# Patient Record
Sex: MALE
[De-identification: names, ages, dates, MRNs, and addresses within clinical notes are randomized per-mention and may not be internally consistent; named-entity substitution may affect disease eponyms.]

---

## 2019-12-30 ENCOUNTER — RECORD ABSTRACTING (OUTPATIENT)
Age: 74
End: 2019-12-30

## 2019-12-30 DIAGNOSIS — Z81.1 FAMILY HISTORY OF ALCOHOL ABUSE AND DEPENDENCE: ICD-10-CM

## 2019-12-30 DIAGNOSIS — E78.5 HYPERLIPIDEMIA, UNSPECIFIED: ICD-10-CM

## 2019-12-30 DIAGNOSIS — Z78.9 OTHER SPECIFIED HEALTH STATUS: ICD-10-CM

## 2019-12-30 DIAGNOSIS — I10 ESSENTIAL (PRIMARY) HYPERTENSION: ICD-10-CM

## 2019-12-30 DIAGNOSIS — Z87.891 PERSONAL HISTORY OF NICOTINE DEPENDENCE: ICD-10-CM

## 2019-12-30 DIAGNOSIS — Z87.898 PERSONAL HISTORY OF OTHER SPECIFIED CONDITIONS: ICD-10-CM

## 2019-12-30 DIAGNOSIS — Z82.3 FAMILY HISTORY OF STROKE: ICD-10-CM

## 2019-12-30 DIAGNOSIS — Z87.448 PERSONAL HISTORY OF OTHER DISEASES OF URINARY SYSTEM: ICD-10-CM

## 2019-12-30 DIAGNOSIS — Z82.5 FAMILY HISTORY OF ASTHMA AND OTHER CHRONIC LOWER RESPIRATORY DISEASES: ICD-10-CM

## 2019-12-30 DIAGNOSIS — Z82.49 FAMILY HISTORY OF ISCHEMIC HEART DISEASE AND OTHER DISEASES OF THE CIRCULATORY SYSTEM: ICD-10-CM

## 2019-12-30 LAB — TESTOST BND SERPL-MCNC: 267.2

## 2019-12-30 RX ORDER — AMLODIPINE BESYLATE 5 MG/1
TABLET ORAL
Refills: 0 | Status: ACTIVE | COMMUNITY

## 2019-12-30 RX ORDER — TAMSULOSIN HYDROCHLORIDE 0.4 MG/1
0.4 CAPSULE ORAL
Refills: 0 | Status: ACTIVE | COMMUNITY

## 2019-12-30 RX ORDER — OLMESARTAN MEDOXOMIL 40 MG/1
TABLET, FILM COATED ORAL
Refills: 0 | Status: ACTIVE | COMMUNITY

## 2020-02-27 ENCOUNTER — APPOINTMENT (OUTPATIENT)
Dept: UROLOGY | Facility: CLINIC | Age: 75
End: 2020-02-27
Payer: MEDICARE

## 2020-02-27 VITALS
HEIGHT: 73 IN | WEIGHT: 225 LBS | DIASTOLIC BLOOD PRESSURE: 100 MMHG | SYSTOLIC BLOOD PRESSURE: 155 MMHG | BODY MASS INDEX: 29.82 KG/M2 | TEMPERATURE: 98.4 F

## 2020-02-27 VITALS
HEART RATE: 69 BPM | TEMPERATURE: 98.4 F | SYSTOLIC BLOOD PRESSURE: 119 MMHG | DIASTOLIC BLOOD PRESSURE: 69 MMHG | HEIGHT: 73 IN | BODY MASS INDEX: 29.82 KG/M2 | WEIGHT: 225 LBS

## 2020-02-27 LAB
BILIRUB UR QL STRIP: NORMAL
CLARITY UR: CLEAR
COLLECTION METHOD: NORMAL
GLUCOSE UR-MCNC: NORMAL
HCG UR QL: 0.2 EU/DL
HGB UR QL STRIP.AUTO: NORMAL
KETONES UR-MCNC: NORMAL
LEUKOCYTE ESTERASE UR QL STRIP: NORMAL
NITRITE UR QL STRIP: NORMAL
PH UR STRIP: 5.5
PROT UR STRIP-MCNC: NORMAL
SP GR UR STRIP: 1.01

## 2020-02-27 PROCEDURE — 76857 US EXAM PELVIC LIMITED: CPT

## 2020-02-27 PROCEDURE — 99215 OFFICE O/P EST HI 40 MIN: CPT

## 2020-02-27 PROCEDURE — 81003 URINALYSIS AUTO W/O SCOPE: CPT | Mod: QW

## 2020-02-27 RX ORDER — METOPROLOL TARTRATE 75 MG/1
TABLET, FILM COATED ORAL
Refills: 0 | Status: ACTIVE | COMMUNITY

## 2020-02-27 NOTE — ASSESSMENT
[FreeTextEntry1] : I discussed the findings and options with . ZOEY LEWIS in detail. He will continue with Flomax daily.  \par Providing the PSA is stable, he will followup in one year (sono, PSA).\par

## 2020-02-27 NOTE — HISTORY OF PRESENT ILLNESS
[FreeTextEntry1] : Mr. ZOEY SANCHEZ comes in today for his urologic follow-up.  He has a history of a Group Grade 1 prostatic adenocarcinoma treated with brachytherapy in May 2012 and has been BRYANT with undetectable PSAs.\par \par From his general urologic history, Mr. Sanchez reprots minimal chronic lower urinary tract symptoms (obstructive and irritative). He is continuing on Flomax 0.4 mg daily.\par IPSS:  8/35\par Sono:  104cc PVR; 81cc prostate\par \par Mr. Sanchez reports normal erectile function. He has a long history of Peyronie's disease (dorsal curve) which has remained stable.\par \par Prostate bx:  2/22/12--[place] 3+3 (1 core, 5%)\par \par PSAs: 4/29/11--4.0; 1/18/12--4.7 (15%); 10/5/12--0.7; 8/26/13--1.4; 9/26/14--0.2; 7/10/15--0.1; 1/8/16--0.1; 11/10/17--0.0; 11/29/18--0.0.\par

## 2020-02-27 NOTE — LETTER BODY
[Dear  ___] : Dear  [unfilled], [Consult Letter:] : I had the pleasure of evaluating your patient, [unfilled]. [Please see my note below.] : Please see my note below. [Consult Closing:] : Thank you very much for allowing me to participate in the care of this patient.  If you have any questions, please do not hesitate to contact me. [Sincerely,] : Sincerely, [FreeTextEntry3] : Edson Carrera MD, FACS\par

## 2020-02-27 NOTE — PHYSICAL EXAM
[General Appearance - Well Developed] : well developed [General Appearance - Well Nourished] : well nourished [Well Groomed] : well groomed [Normal Appearance] : normal appearance [Abdomen Soft] : soft [Abdomen Tenderness] : non-tender [General Appearance - In No Acute Distress] : no acute distress [Abdomen Mass (___ Cm)] : no abdominal mass palpated [Abdomen Hernia] : no hernia was discovered [Costovertebral Angle Tenderness] : no ~M costovertebral angle tenderness [Urethral Meatus] : meatus normal [Penis Abnormality] : normal circumcised penis [Urinary Bladder Findings] : the bladder was normal on palpation [Scrotum] : the scrotum was normal [Testes Tenderness] : no tenderness of the testes [Epididymis] : the epididymides were normal [Testes Mass (___cm)] : there were no testicular masses [No Prostate Nodules] : no prostate nodules [Prostate Tenderness] : the prostate was not tender [Heart Rate And Rhythm] : Heart rate and rhythm were normal [Skin Color & Pigmentation] : normal skin color and pigmentation [] : no respiratory distress [Edema] : no peripheral edema [Respiration, Rhythm And Depth] : normal respiratory rhythm and effort [Oriented To Time, Place, And Person] : oriented to person, place, and time [Exaggerated Use Of Accessory Muscles For Inspiration] : no accessory muscle use [Affect] : the affect was normal [Not Anxious] : not anxious [Mood] : the mood was normal [Normal Station and Gait] : the gait and station were normal for the patient's age [No Palpable Adenopathy] : no palpable adenopathy [No Focal Deficits] : no focal deficits [FreeTextEntry1] : Small right epididymal cyst

## 2020-02-28 LAB — PSA SERPL-MCNC: <0.01 NG/ML

## 2021-03-08 ENCOUNTER — RX RENEWAL (OUTPATIENT)
Age: 76
End: 2021-03-08

## 2021-03-22 ENCOUNTER — APPOINTMENT (OUTPATIENT)
Dept: UROLOGY | Facility: CLINIC | Age: 76
End: 2021-03-22
Payer: MEDICARE

## 2021-03-22 VITALS
DIASTOLIC BLOOD PRESSURE: 79 MMHG | SYSTOLIC BLOOD PRESSURE: 144 MMHG | TEMPERATURE: 97.8 F | HEART RATE: 78 BPM | OXYGEN SATURATION: 94 %

## 2021-03-22 DIAGNOSIS — Z00.00 ENCOUNTER FOR GENERAL ADULT MEDICAL EXAMINATION W/OUT ABNORMAL FINDINGS: ICD-10-CM

## 2021-03-22 LAB
BILIRUB UR QL STRIP: NORMAL
CLARITY UR: CLEAR
COLLECTION METHOD: NORMAL
GLUCOSE UR-MCNC: NORMAL
HCG UR QL: 0.2 EU/DL
HGB UR QL STRIP.AUTO: NORMAL
KETONES UR-MCNC: NORMAL
LEUKOCYTE ESTERASE UR QL STRIP: NORMAL
NITRITE UR QL STRIP: NORMAL
PH UR STRIP: 5.5
PROT UR STRIP-MCNC: NORMAL
SP GR UR STRIP: 1.02

## 2021-03-22 PROCEDURE — 76857 US EXAM PELVIC LIMITED: CPT

## 2021-03-22 PROCEDURE — 81003 URINALYSIS AUTO W/O SCOPE: CPT | Mod: QW

## 2021-03-22 PROCEDURE — 99215 OFFICE O/P EST HI 40 MIN: CPT

## 2021-03-22 NOTE — PHYSICAL EXAM

## 2021-03-22 NOTE — ADDENDUM
[FreeTextEntry1] : A portion of this note was written by [Maximus Fraga] on 03/22/2021 acting as a scribe for Dr. Carrera. \par \par I have personally reviewed the chart and agree that the record accurately reflects my personal performance of the history, physical exam, assessment, and plan.

## 2021-03-22 NOTE — ASSESSMENT
[FreeTextEntry1] : I discussed the findings and options with Mr. ZOEY SANCHEZ in detail. He will likely continue with Flomax daily (but may consider discontinuing this to establish its efficacy).\par \par Providing the PSA is stable, I look forward to seeing Mr. Sanchez in one year (bladder sono, PSA).\par

## 2021-03-22 NOTE — HISTORY OF PRESENT ILLNESS
[FreeTextEntry1] : Mr. ZOEY SANCHEZ comes in today for his urologic follow-up.  He has a history of a Group Grade 1 prostatic adenocarcinoma treated with brachytherapy in May 2012 and has been BRYANT with undetectable PSAs.  He denies hematuria but has noted bright red blood with certain bowel movements, attributed to hemorrhoids.  He will schedule a colonoscopy (with the last endoscopic evaluation being 3 years ago).\par \par From his general urologic history, Mr. Sanchez reprots minimal chronic lower urinary tract symptoms (obstructive and irritative). He is continuing on Flomax 0.4 mg daily.\par IPSS:  6/35\par Sono: 22cc PVR; Prostate 25cc\par \par Mr. Sanchez reports normal erectile function. 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

## 2021-03-23 ENCOUNTER — NON-APPOINTMENT (OUTPATIENT)
Age: 76
End: 2021-03-23

## 2021-03-23 LAB — PSA SERPL-MCNC: <0.01 NG/ML

## 2022-03-25 ENCOUNTER — APPOINTMENT (OUTPATIENT)
Dept: UROLOGY | Facility: CLINIC | Age: 77
End: 2022-03-25
Payer: MEDICARE

## 2022-03-25 VITALS
HEIGHT: 73 IN | TEMPERATURE: 98.2 F | BODY MASS INDEX: 24.92 KG/M2 | SYSTOLIC BLOOD PRESSURE: 128 MMHG | WEIGHT: 188 LBS | HEART RATE: 68 BPM | RESPIRATION RATE: 14 BRPM | DIASTOLIC BLOOD PRESSURE: 82 MMHG

## 2022-03-25 DIAGNOSIS — Z80.42 FAMILY HISTORY OF MALIGNANT NEOPLASM OF PROSTATE: ICD-10-CM

## 2022-03-25 LAB
BILIRUB UR QL STRIP: NEGATIVE
CLARITY UR: CLEAR
COLLECTION METHOD: NORMAL
GLUCOSE UR-MCNC: NEGATIVE
HCG UR QL: 0.2 EU/DL
HGB UR QL STRIP.AUTO: NEGATIVE
KETONES UR-MCNC: NEGATIVE
LEUKOCYTE ESTERASE UR QL STRIP: NEGATIVE
NITRITE UR QL STRIP: NEGATIVE
PH UR STRIP: 5.5
PROT UR STRIP-MCNC: NORMAL
SP GR UR STRIP: 1.01

## 2022-03-25 PROCEDURE — 99214 OFFICE O/P EST MOD 30 MIN: CPT

## 2022-03-25 PROCEDURE — 81003 URINALYSIS AUTO W/O SCOPE: CPT | Mod: QW

## 2022-03-25 PROCEDURE — 76857 US EXAM PELVIC LIMITED: CPT

## 2022-03-25 NOTE — PHYSICAL EXAM
[General Appearance - Well Developed] : well developed [General Appearance - Well Nourished] : well nourished [Normal Appearance] : normal appearance [Well Groomed] : well groomed [General Appearance - In No Acute Distress] : no acute distress [Abdomen Soft] : soft [Abdomen Tenderness] : non-tender [Abdomen Mass (___ Cm)] : no abdominal mass palpated [Abdomen Hernia] : no hernia was discovered [Costovertebral Angle Tenderness] : no ~M costovertebral angle tenderness [Urethral Meatus] : meatus normal [Penis Abnormality] : normal circumcised penis [Urinary Bladder Findings] : the bladder was normal on palpation [Scrotum] : the scrotum was normal [Epididymis] : the epididymides were normal [Testes Tenderness] : no tenderness of the testes [Prostate Tenderness] : the prostate was not tender [Testes Mass (___cm)] : there were no testicular masses [No Prostate Nodules] : no prostate nodules [Skin Color & Pigmentation] : normal skin color and pigmentation [Edema] : no peripheral edema [Heart Rate And Rhythm] : Heart rate and rhythm were normal [] : no respiratory distress [Respiration, Rhythm And Depth] : normal respiratory rhythm and effort [Exaggerated Use Of Accessory Muscles For Inspiration] : no accessory muscle use [Oriented To Time, Place, And Person] : oriented to person, place, and time [Affect] : the affect was normal [Mood] : the mood was normal [Not Anxious] : not anxious [Normal Station and Gait] : the gait and station were normal for the patient's age [No Focal Deficits] : no focal deficits [No Palpable Adenopathy] : no palpable adenopathy [FreeTextEntry1] : Small right epididymal cyst

## 2022-03-25 NOTE — HISTORY OF PRESENT ILLNESS
[FreeTextEntry1] : Mr. ZOEY SANCHEZ comes in today for his urologic follow-up.  He has a history of a Group Grade 1 prostatic adenocarcinoma treated with brachytherapy in May 2012 and has been BRYANT with undetectable PSAs. \par \par From his general urologic history, Mr. Sanchez reports minimal chronic lower urinary tract symptoms (obstructive and irritative). He is continuing on Flomax 0.4 mg daily.\par IPSS:  5/35\par Sono:  53cc PVR; 20cc prostate\par \par Mr. Sanchez reports normal erectile function. \par He has a long history of Peyronie's disease (dorsal curve) which has remained stable and is inconsequential.\par \par (Mr. Sanchez intentionally lost ~40lbs since last year and feels much better overall).\par \par Prostate bx:  2/22/12--[place] 3+3 (1 core, 5%)\par \par PSAs: 4/29/11--4.0; 1/18/12--4.7 (15%); 10/5/12--0.7; 8/26/13--1.4; 9/26/14--0.2; 7/10/15--0.1; 1/8/16--0.1; 11/10/17--0.0; 11/29/18--0.0; 2/28/20--<0.01; 3/23/21--<0.01; \par

## 2022-03-25 NOTE — ADDENDUM
[FreeTextEntry1] : A portion of this note was written by [Maximus Fraga] on 03/24/2022 acting as a scribe for Dr. Carrera. \par \par I have personally reviewed the chart and agree that the record accurately reflects my personal performance of the history, physical exam, assessment, and plan.

## 2022-03-25 NOTE — ASSESSMENT
[FreeTextEntry1] : I discussed the findings and options with Mr. ZOEY SANCHEZ in detail.  Urologically, he is doing very well overall and will simply continue with Flomax daily.\par \par Providing the PSA is stable and there are no new problems, I look forward to seeing Mr. Sanchez in one year (bladder sono, PSA).\par

## 2022-03-25 NOTE — LETTER BODY
[Dear  ___] : Dear  [unfilled], [Consult Letter:] : I had the pleasure of evaluating your patient, [unfilled]. [Please see my note below.] : Please see my note below. [Consult Closing:] : Thank you very much for allowing me to participate in the care of this patient.  If you have any questions, please do not hesitate to contact me. [Sincerely,] : Sincerely, [DrTali  ___] : Dr. CARDENAS [FreeTextEntry3] : Edson Carrera MD, FACS\par

## 2022-03-28 LAB — PSA SERPL-MCNC: <0.01 NG/ML

## 2022-03-29 ENCOUNTER — NON-APPOINTMENT (OUTPATIENT)
Age: 77
End: 2022-03-29

## 2023-03-30 ENCOUNTER — APPOINTMENT (OUTPATIENT)
Dept: UROLOGY | Facility: CLINIC | Age: 78
End: 2023-03-30
Payer: MEDICARE

## 2023-03-30 VITALS
DIASTOLIC BLOOD PRESSURE: 93 MMHG | HEART RATE: 71 BPM | OXYGEN SATURATION: 97 % | SYSTOLIC BLOOD PRESSURE: 141 MMHG | TEMPERATURE: 98.4 F

## 2023-03-30 DIAGNOSIS — R39.9 UNSPECIFIED SYMPTOMS AND SIGNS INVOLVING THE GENITOURINARY SYSTEM: ICD-10-CM

## 2023-03-30 DIAGNOSIS — R35.1 NOCTURIA: ICD-10-CM

## 2023-03-30 DIAGNOSIS — M72.0 PALMAR FASCIAL FIBROMATOSIS [DUPUYTREN]: ICD-10-CM

## 2023-03-30 DIAGNOSIS — C61 MALIGNANT NEOPLASM OF PROSTATE: ICD-10-CM

## 2023-03-30 DIAGNOSIS — N48.6 INDURATION PENIS PLASTICA: ICD-10-CM

## 2023-03-30 DIAGNOSIS — N43.40 SPERMATOCELE OF EPIDIDYMIS, UNSPECIFIED: ICD-10-CM

## 2023-03-30 PROCEDURE — 99215 OFFICE O/P EST HI 40 MIN: CPT

## 2023-03-30 PROCEDURE — 51798 US URINE CAPACITY MEASURE: CPT

## 2023-03-30 RX ORDER — TAMSULOSIN HYDROCHLORIDE 0.4 MG/1
0.4 CAPSULE ORAL
Qty: 90 | Refills: 3 | Status: ACTIVE | COMMUNITY
Start: 2020-03-12 | End: 1900-01-01

## 2023-03-30 NOTE — ADDENDUM
[FreeTextEntry1] : A portion of this note was written by [Maximus Fraga] on 03/29/2023 acting as a scribe for Dr. Carrera. \par \par I have personally reviewed the chart and agree that the record accurately reflects my personal performance of the history, physical exam, assessment, and plan.

## 2023-03-30 NOTE — PHYSICAL EXAM
[General Appearance - Well Developed] : well developed [General Appearance - Well Nourished] : well nourished [Normal Appearance] : normal appearance [Well Groomed] : well groomed [General Appearance - In No Acute Distress] : no acute distress [Abdomen Soft] : soft [Abdomen Tenderness] : non-tender [Abdomen Mass (___ Cm)] : no abdominal mass palpated [Abdomen Hernia] : no hernia was discovered [Costovertebral Angle Tenderness] : no ~M costovertebral angle tenderness [Urethral Meatus] : meatus normal [Penis Abnormality] : normal circumcised penis [Urinary Bladder Findings] : the bladder was normal on palpation [Scrotum] : the scrotum was normal [Epididymis] : the epididymides were normal [Testes Tenderness] : no tenderness of the testes [Testes Mass (___cm)] : there were no testicular masses [Prostate Tenderness] : the prostate was not tender [No Prostate Nodules] : no prostate nodules [Skin Color & Pigmentation] : normal skin color and pigmentation [Heart Rate And Rhythm] : Heart rate and rhythm were normal [Edema] : no peripheral edema [] : no respiratory distress [Respiration, Rhythm And Depth] : normal respiratory rhythm and effort [Exaggerated Use Of Accessory Muscles For Inspiration] : no accessory muscle use [Oriented To Time, Place, And Person] : oriented to person, place, and time [Affect] : the affect was normal [Not Anxious] : not anxious [Mood] : the mood was normal [Normal Station and Gait] : the gait and station were normal for the patient's age [No Focal Deficits] : no focal deficits [Inguinal Lymph Nodes Enlarged Bilaterally] : inguinal [FreeTextEntry1] : Small right epididymal cyst

## 2023-03-30 NOTE — ASSESSMENT
[FreeTextEntry1] : I discussed the findings and options with . ZOEY SANCHEZ in detail.  He is doing very well urologically and will decide if he wishes to continue on the tamsulosin 0.4mg qd.  A one year Rx was provided.\par \par A PSA is pending and we will call him with that result. Providing this remains undetectable and there are no new problems, I look forward to seeing Mr. Sanchez in one year (bladder sono, PSA).\par

## 2023-03-30 NOTE — HISTORY OF PRESENT ILLNESS
[FreeTextEntry1] : Mr. ZOEY SANCHEZ comes in today for his annual urologic follow-up.  He has a history of a Group Grade 1 prostatic adenocarcinoma treated with brachytherapy in May 2012 and has been BRYANT with undetectable PSAs. He denies hematuria, hematochezia or systemic complaints. \par \par From his general urologic history, Mr. Sanchez reports minimal chronic lower urinary tract symptoms (obstructive and irritative). He is continuing on Flomax 0.4 mg daily.\par IPSS: 4/35\par Sono (performed to assess bladder emptying): PVR 39 cc\par \par Mr. Sanchez reports normal erectile function. \par He has a long history of Peyronie's disease (dorsal curve) which has remained stable and is inconsequential.\par \par (Mr. Sanchez intentionally lost ~40lbs since last year and feels much better overall).\par \par Prostate bx:  2/22/12--[place] 3+3 (1 core, 5%)\par \par PSAs: 4/29/11--4.0; 1/18/12--4.7 (15%); 10/5/12--0.7; 8/26/13--1.4; 9/26/14--0.2; 7/10/15--0.1; 1/8/16--0.1; 11/10/17--0.0; 11/29/18--0.0; 2/28/20--<0.01; 3/23/21--<0.01; 3/25/22--<0.01\par

## 2023-03-31 ENCOUNTER — NON-APPOINTMENT (OUTPATIENT)
Age: 78
End: 2023-03-31

## 2023-03-31 LAB — PSA SERPL-MCNC: <0.01 NG/ML
